# Patient Record
Sex: MALE | Race: WHITE | NOT HISPANIC OR LATINO | URBAN - METROPOLITAN AREA
[De-identification: names, ages, dates, MRNs, and addresses within clinical notes are randomized per-mention and may not be internally consistent; named-entity substitution may affect disease eponyms.]

---

## 2017-03-19 ENCOUNTER — EMERGENCY (EMERGENCY)
Facility: HOSPITAL | Age: 72
LOS: 1 days | Discharge: HOME | End: 2017-03-19
Admitting: EMERGENCY MEDICINE
Payer: MEDICARE

## 2017-03-19 VITALS
TEMPERATURE: 99 F | SYSTOLIC BLOOD PRESSURE: 151 MMHG | DIASTOLIC BLOOD PRESSURE: 86 MMHG | HEART RATE: 89 BPM | RESPIRATION RATE: 18 BRPM | OXYGEN SATURATION: 98 %

## 2017-03-19 PROCEDURE — 99283 EMERGENCY DEPT VISIT LOW MDM: CPT | Mod: 25

## 2017-03-19 RX ORDER — IBUPROFEN 200 MG
600 TABLET ORAL ONCE
Qty: 0 | Refills: 0 | Status: COMPLETED | OUTPATIENT
Start: 2017-03-19 | End: 2017-03-19

## 2017-03-19 RX ADMIN — Medication 600 MILLIGRAM(S): at 23:51

## 2017-03-19 NOTE — ED ADULT TRIAGE NOTE - CHIEF COMPLAINT QUOTE
pt. c/o R knee pain/swelling x 1 day, states it started while he was laying down yesterday, able to bear weight but states it is painful.  PMHx arthritis, takes 650mg for pain, last dose 830pm tonight.  Denies trauma/injury to the extremity.  Minor swelling noted, no redness/deformity.

## 2017-03-19 NOTE — ED PROVIDER NOTE - CARE PLAN
Principal Discharge DX:	Knee pain  Instructions for follow-up, activity and diet:	Rest, ice, elevate area.  Take Motrin 600mg every 8 hrs with food for pain.  Follow up with PMD within 48-72 hrs.  Any worsening pain, swelling, weakness, numbness return to ED. Ortho referral list provided if needed.

## 2017-03-19 NOTE — ED PROVIDER NOTE - PLAN OF CARE
Rest, ice, elevate area.  Take Motrin 600mg every 8 hrs with food for pain.  Follow up with PMD within 48-72 hrs.  Any worsening pain, swelling, weakness, numbness return to ED. Ortho referral list provided if needed.

## 2017-03-19 NOTE — ED PROVIDER NOTE - PROGRESS NOTE DETAILS
xray reviewed with Dr Lucero, +arthritic changes, no acute fx xray reviewed with Dr Lucero, +arthritic changes, no acute fx; bulky soliz applied, cane given to assist in ambulation xray reviewed with Dr Lucero, +arthritic changes, no acute fx; bulky soliz applied, cane given to assist in ambulation, pt requesting d/c - does not want to wait for prelim xray read from radiology resident

## 2017-03-19 NOTE — ED PROVIDER NOTE - OBJECTIVE STATEMENT
72 y/o M PMH BPH c/o atraumatic right knee pain since last night. Pain Pt states he was sitting in chair when pain started and now having difficulty ambulating and sleeping tonight because of pain. Pt took tylenol at approx 2030 last night with mild relief. Denies fever, chills, paresthesias, weakness, trauma. 72 y/o M PMH BPH, arthritis c/o atraumatic right knee pain since last night. Pain Pt states he was sitting in chair when pain started and now having difficulty ambulating and sleeping tonight because of pain. Pt took tylenol at approx 2030 last night with mild relief. Denies fever, chills, paresthesias, weakness, trauma, CP, SOB.

## 2017-03-19 NOTE — ED PROVIDER NOTE - MEDICAL DECISION MAKING DETAILS
70 y/o M with atraumatic right knee pain, low clinical suspicion for septic joint, likely exacerbation of arthritis  -xray, pain control, reassess

## 2017-03-19 NOTE — ED PROVIDER NOTE - LOWER EXTREMITY EXAM, RIGHT
LIMITED ROM/SWELLING/TENDERNESS/no obvious deformity, mild + suprapatellar swelling, mild TTP over patella and patella tendon, full extension, limited flexion past 90 degrees, sensation and strength intact

## 2017-03-20 PROCEDURE — 73564 X-RAY EXAM KNEE 4 OR MORE: CPT | Mod: 26,RT

## 2021-02-14 ENCOUNTER — EMERGENCY (EMERGENCY)
Facility: HOSPITAL | Age: 76
LOS: 1 days | Discharge: HOME | End: 2021-02-14
Attending: EMERGENCY MEDICINE
Payer: MEDICARE

## 2021-02-14 VITALS
DIASTOLIC BLOOD PRESSURE: 79 MMHG | OXYGEN SATURATION: 97 % | SYSTOLIC BLOOD PRESSURE: 165 MMHG | RESPIRATION RATE: 22 BRPM | TEMPERATURE: 97 F | HEIGHT: 70 IN | WEIGHT: 186.07 LBS | HEART RATE: 84 BPM

## 2021-02-14 VITALS
OXYGEN SATURATION: 99 % | TEMPERATURE: 98 F | HEART RATE: 65 BPM | SYSTOLIC BLOOD PRESSURE: 163 MMHG | DIASTOLIC BLOOD PRESSURE: 77 MMHG | RESPIRATION RATE: 20 BRPM

## 2021-02-14 PROCEDURE — 70450 CT HEAD/BRAIN W/O DYE: CPT

## 2021-02-14 PROCEDURE — 93005 ELECTROCARDIOGRAM TRACING: CPT

## 2021-02-14 PROCEDURE — 99284 EMERGENCY DEPT VISIT MOD MDM: CPT

## 2021-02-14 PROCEDURE — 99284 EMERGENCY DEPT VISIT MOD MDM: CPT | Mod: 25

## 2021-02-14 PROCEDURE — 70450 CT HEAD/BRAIN W/O DYE: CPT | Mod: 26

## 2021-02-14 NOTE — ED PROVIDER NOTE - CLINICAL SUMMARY MEDICAL DECISION MAKING FREE TEXT BOX
Pt is a 75 year old man with PMHx of arthritis, HTN and BPH who presents to the ED after a fall two days ago. NO LOC NO pain at present. Slight pain over bridge of nose. Periorbital ecchymosis not on ACs. Alert and oriented. Will r/o intracranial/subdural bleed and evaluate for any facial fractures on CT head.

## 2021-02-14 NOTE — ED PROVIDER NOTE - PATIENT PORTAL LINK FT
You can access the FollowMyHealth Patient Portal offered by [facility] by registering at the following website: [email or web address]. By joining [employer]’s FollowMyHealth portal, you will also be able to view your health information using other applications (apps) compatible with our system.

## 2021-02-14 NOTE — ED PROVIDER NOTE - NSFOLLOWUPINSTRUCTIONS_ED_ALL_ED_FT
IMPORTANT INSTRUCTIONS FROM Dr. CONTEH:    Please follow up with your personal medical doctor in 24-48 hours.   Bring results from today to your visit.    If you were advised to take any medications - be sure to review the package insert.    If your symptoms change, get worse or if you have any new symptoms, come to the ER right away.  If you have any questions, call the ER at the phone number on this page.

## 2021-02-14 NOTE — ED PROVIDER NOTE - ENMT, MLM
Airway patent, Nasal mucosa clear. Mouth with normal mucosa. Throat has no vesicles, no oropharyngeal exudates and uvula is midline. Pain to palpation over bridge of nose.

## 2021-02-14 NOTE — ED ADULT NURSE NOTE - OBJECTIVE STATEMENT
presents with c/o fall 2 days ago, bruise to B/L eye noted, denies dizziness, headache,   vision changes.

## 2021-02-14 NOTE — ED PROVIDER NOTE - CARE PLAN
Principal Discharge DX:	Facial pain   Principal Discharge DX:	Forehead contusion, initial encounter

## 2021-02-14 NOTE — ED PROVIDER NOTE - OBJECTIVE STATEMENT
Pt is a 75 year old man with PMHx of arthritis, HTN and BPH who presents to the ED after a fall two days ago. Pt was going down to his basement to evaluate his boiler when he slipped forward and fell hitting his face. Pt did not loose consciousness. He got up immediately and did not have any overt signs of injury. Pt states he had a bit of a bloody nose, which resolved quickly. The next day his wife noticed some bruising around his left eye, then today she noticed more bruising on his right and told him to come to ED for evaluation. Pt denies taking any ACs including ASA. Denies neck or back tenderness or pain. NO SOB or chest pain. No N/V or diarrhea. No vision changes reported.

## 2021-02-14 NOTE — ED PROVIDER NOTE - ATTENDING CONTRIBUTION TO CARE
76 yo m with pmh of HTN presents s/p mechanical slip and fall yesterday. Noticed facial bruising today so wife told pt to come to ED. Denies acute complaints.     VS wnl  No scalp hematomas  Bilateral bruising to medial periorbital area without nasal deformity  Neck without ttp  RRR  Lungs clear, chest wall stable  Abdo soft  AAOx3, CN's II-XII intact, strength 5/5 bilateral UE and LE, sensation intact to light touch, finger to nose intact, steady gait    AP - fall with facial trauma  Will obtain CT head given age, however low suspicion for ICH    315PM - CT pending, will sign out to Dr De La O to f/u

## 2021-02-14 NOTE — ED PROVIDER NOTE - EYES, MLM
Clear bilaterally, pupils equal, round and reactive to light. periorbital ecchymosis not tenderness to palpation.

## 2021-02-16 PROBLEM — N40.0 BENIGN PROSTATIC HYPERPLASIA WITHOUT LOWER URINARY TRACT SYMPTOMS: Chronic | Status: ACTIVE | Noted: 2017-03-20

## 2021-07-21 ENCOUNTER — EMERGENCY (EMERGENCY)
Facility: HOSPITAL | Age: 76
LOS: 1 days | Discharge: HOME | End: 2021-07-21
Attending: EMERGENCY MEDICINE
Payer: MEDICARE

## 2021-07-21 VITALS
HEIGHT: 70 IN | SYSTOLIC BLOOD PRESSURE: 146 MMHG | HEART RATE: 92 BPM | OXYGEN SATURATION: 97 % | WEIGHT: 176.37 LBS | RESPIRATION RATE: 19 BRPM | DIASTOLIC BLOOD PRESSURE: 73 MMHG | TEMPERATURE: 98 F

## 2021-07-21 PROBLEM — N40.0 BENIGN PROSTATIC HYPERPLASIA WITHOUT LOWER URINARY TRACT SYMPTOMS: Chronic | Status: ACTIVE | Noted: 2021-02-14

## 2021-07-21 PROBLEM — I10 ESSENTIAL (PRIMARY) HYPERTENSION: Chronic | Status: ACTIVE | Noted: 2021-02-14

## 2021-07-21 PROCEDURE — 99283 EMERGENCY DEPT VISIT LOW MDM: CPT

## 2021-07-21 PROCEDURE — 99284 EMERGENCY DEPT VISIT MOD MDM: CPT | Mod: 25

## 2021-07-21 PROCEDURE — 73610 X-RAY EXAM OF ANKLE: CPT | Mod: 26,RT

## 2021-07-21 PROCEDURE — 73562 X-RAY EXAM OF KNEE 3: CPT

## 2021-07-21 PROCEDURE — 73610 X-RAY EXAM OF ANKLE: CPT

## 2021-07-21 PROCEDURE — 73562 X-RAY EXAM OF KNEE 3: CPT | Mod: 26,RT

## 2021-07-21 RX ORDER — IBUPROFEN 200 MG
1 TABLET ORAL
Qty: 30 | Refills: 0
Start: 2021-07-21

## 2021-07-21 RX ORDER — IBUPROFEN 200 MG
600 TABLET ORAL ONCE
Refills: 0 | Status: COMPLETED | OUTPATIENT
Start: 2021-07-21 | End: 2021-07-21

## 2021-07-21 RX ORDER — OXYCODONE HYDROCHLORIDE 5 MG/1
1 TABLET ORAL
Qty: 6 | Refills: 0
Start: 2021-07-21

## 2021-07-21 RX ADMIN — Medication 600 MILLIGRAM(S): at 13:12

## 2021-07-21 RX ADMIN — Medication 600 MILLIGRAM(S): at 13:41

## 2021-07-21 NOTE — ED PROVIDER NOTE - CLINICAL SUMMARY MEDICAL DECISION MAKING FREE TEXT BOX
Patient with right ankle pain, right knee pain and swelling x3days Will do XR, give ibuprofen for pain and reassess.

## 2021-07-21 NOTE — ED ADULT TRIAGE NOTE - CHIEF COMPLAINT QUOTE
right ankle swelling and pain X 2 days radiating to right leg last took tylenol this morning with no relief.

## 2021-07-21 NOTE — ED PROVIDER NOTE - OBJECTIVE STATEMENT
74 y/o M patient with a significant PMHx of HTN, chronic knee problems presents to the ED with c/o 3d of right ankle pain and right knee pain and swelling today. Patient reports that he is not sure it happened. Patient states taking Tylenol with no relief.

## 2021-07-21 NOTE — ED PROVIDER NOTE - TEMPLATE, MLM
Refill:    Ondansetron ODT  4MG  Qty 60  Dissolve one tablet by mouth every   8hrs as needed for nausea    [facility]  [phone]   Orthopedic

## 2021-07-21 NOTE — ED PROVIDER NOTE - PATIENT PORTAL LINK FT
You can access the FollowMyHealth Patient Portal offered by [facility] by registering at the following website: [email or web address]. By joining [employer]’s FollowMyHealth portal, you will also be able to view your health information using other applications (apps) compatible with our system.

## 2021-11-01 NOTE — ED PROVIDER NOTE - HISTORY ATTESTATION, MLM
Called patient to see if he had gotten his medication yet and he said he hadn't spoken with [facility] yet. Dr. Farrell had advised to check and make sure this patient gets his Cosentyx. Sent a message to [facility] to let them know patient is back in town.       Received: Today  Zonia Mckeon RN  [facility],     I just called this patient who was out of town for a few days and he is back in town now. He said he did not talk to [facility] yet. I let him know that he would need to talk to you before [facility] could send his Cosentyx to the [employer] site at [facility]. Please call him when convenient for you. He denied training from Dr. Farrell's office for Cosentyx.     Thank you, and have a good Monday,   DON Brar            I have reviewed and confirmed nurses' notes...

## 2023-02-22 ENCOUNTER — EMERGENCY (EMERGENCY)
Facility: HOSPITAL | Age: 78
LOS: 1 days | Discharge: HOME | End: 2023-02-22
Attending: EMERGENCY MEDICINE
Payer: MEDICARE

## 2023-02-22 VITALS
OXYGEN SATURATION: 98 % | HEIGHT: 69 IN | TEMPERATURE: 98 F | DIASTOLIC BLOOD PRESSURE: 87 MMHG | HEART RATE: 89 BPM | SYSTOLIC BLOOD PRESSURE: 155 MMHG | WEIGHT: 190.92 LBS | RESPIRATION RATE: 18 BRPM

## 2023-02-22 LAB
ALBUMIN SERPL ELPH-MCNC: 3.8 G/DL — SIGNIFICANT CHANGE UP (ref 3.5–5)
ALP SERPL-CCNC: 70 U/L — SIGNIFICANT CHANGE UP (ref 40–120)
ALT FLD-CCNC: 37 U/L DA — SIGNIFICANT CHANGE UP (ref 10–60)
ANION GAP SERPL CALC-SCNC: 8 MMOL/L — SIGNIFICANT CHANGE UP (ref 5–17)
AST SERPL-CCNC: 23 U/L — SIGNIFICANT CHANGE UP (ref 10–40)
BASOPHILS # BLD AUTO: 0.09 K/UL — SIGNIFICANT CHANGE UP (ref 0–0.2)
BASOPHILS NFR BLD AUTO: 1.2 % — SIGNIFICANT CHANGE UP (ref 0–2)
BILIRUB SERPL-MCNC: 0.3 MG/DL — SIGNIFICANT CHANGE UP (ref 0.2–1.2)
BUN SERPL-MCNC: 11 MG/DL — SIGNIFICANT CHANGE UP (ref 7–18)
CALCIUM SERPL-MCNC: 9.6 MG/DL — SIGNIFICANT CHANGE UP (ref 8.4–10.5)
CHLORIDE SERPL-SCNC: 104 MMOL/L — SIGNIFICANT CHANGE UP (ref 96–108)
CO2 SERPL-SCNC: 24 MMOL/L — SIGNIFICANT CHANGE UP (ref 22–31)
CREAT SERPL-MCNC: 0.84 MG/DL — SIGNIFICANT CHANGE UP (ref 0.5–1.3)
EGFR: 89 ML/MIN/1.73M2 — SIGNIFICANT CHANGE UP
EOSINOPHIL # BLD AUTO: 0.26 K/UL — SIGNIFICANT CHANGE UP (ref 0–0.5)
EOSINOPHIL NFR BLD AUTO: 3.5 % — SIGNIFICANT CHANGE UP (ref 0–6)
GLUCOSE SERPL-MCNC: 113 MG/DL — HIGH (ref 70–99)
HCT VFR BLD CALC: 40.6 % — SIGNIFICANT CHANGE UP (ref 39–50)
HGB BLD-MCNC: 13.5 G/DL — SIGNIFICANT CHANGE UP (ref 13–17)
IMM GRANULOCYTES NFR BLD AUTO: 0.4 % — SIGNIFICANT CHANGE UP (ref 0–0.9)
LYMPHOCYTES # BLD AUTO: 1.56 K/UL — SIGNIFICANT CHANGE UP (ref 1–3.3)
LYMPHOCYTES # BLD AUTO: 21.3 % — SIGNIFICANT CHANGE UP (ref 13–44)
MCHC RBC-ENTMCNC: 28.6 PG — SIGNIFICANT CHANGE UP (ref 27–34)
MCHC RBC-ENTMCNC: 33.3 GM/DL — SIGNIFICANT CHANGE UP (ref 32–36)
MCV RBC AUTO: 86 FL — SIGNIFICANT CHANGE UP (ref 80–100)
MONOCYTES # BLD AUTO: 0.64 K/UL — SIGNIFICANT CHANGE UP (ref 0–0.9)
MONOCYTES NFR BLD AUTO: 8.7 % — SIGNIFICANT CHANGE UP (ref 2–14)
NEUTROPHILS # BLD AUTO: 4.76 K/UL — SIGNIFICANT CHANGE UP (ref 1.8–7.4)
NEUTROPHILS NFR BLD AUTO: 64.9 % — SIGNIFICANT CHANGE UP (ref 43–77)
NRBC # BLD: 0 /100 WBCS — SIGNIFICANT CHANGE UP (ref 0–0)
PLATELET # BLD AUTO: 256 K/UL — SIGNIFICANT CHANGE UP (ref 150–400)
POTASSIUM SERPL-MCNC: 3.8 MMOL/L — SIGNIFICANT CHANGE UP (ref 3.5–5.3)
POTASSIUM SERPL-SCNC: 3.8 MMOL/L — SIGNIFICANT CHANGE UP (ref 3.5–5.3)
PROT SERPL-MCNC: 7.6 G/DL — SIGNIFICANT CHANGE UP (ref 6–8.3)
RBC # BLD: 4.72 M/UL — SIGNIFICANT CHANGE UP (ref 4.2–5.8)
RBC # FLD: 13.1 % — SIGNIFICANT CHANGE UP (ref 10.3–14.5)
SODIUM SERPL-SCNC: 136 MMOL/L — SIGNIFICANT CHANGE UP (ref 135–145)
TROPONIN I, HIGH SENSITIVITY RESULT: 3.9 NG/L — SIGNIFICANT CHANGE UP
WBC # BLD: 7.34 K/UL — SIGNIFICANT CHANGE UP (ref 3.8–10.5)
WBC # FLD AUTO: 7.34 K/UL — SIGNIFICANT CHANGE UP (ref 3.8–10.5)

## 2023-02-22 PROCEDURE — 99284 EMERGENCY DEPT VISIT MOD MDM: CPT

## 2023-02-22 PROCEDURE — 99285 EMERGENCY DEPT VISIT HI MDM: CPT

## 2023-02-22 PROCEDURE — 93005 ELECTROCARDIOGRAM TRACING: CPT

## 2023-02-22 PROCEDURE — 85025 COMPLETE CBC W/AUTO DIFF WBC: CPT

## 2023-02-22 PROCEDURE — 36415 COLL VENOUS BLD VENIPUNCTURE: CPT

## 2023-02-22 PROCEDURE — 84484 ASSAY OF TROPONIN QUANT: CPT

## 2023-02-22 PROCEDURE — 80053 COMPREHEN METABOLIC PANEL: CPT

## 2023-02-22 NOTE — ED PROVIDER NOTE - NEUROLOGICAL, MLM
Alert and oriented, no focal deficits, no motor or sensory deficits. cn ii-xii intact, no dysmetria, non-ataxic gait, neck supple

## 2023-02-22 NOTE — ED PROVIDER NOTE - CLINICAL SUMMARY MEDICAL DECISION MAKING FREE TEXT BOX
77 yr old male with hx of HTN, anxiety recently started on zoloft and BPh presents to ed c/o chest tightness, sob, headache and flushed sensation from feet up to head with high bp on and off. no fever, no syncope, no n/v, no abd pain.     anxiety causing sx r/o atypical acs. labs, ekg, cxr, reassurance and encourage to continue with pcp care.

## 2023-02-22 NOTE — ED PROVIDER NOTE - OBJECTIVE STATEMENT
77 yr old male with hx of HTN, anxiety recently started on zoloft and BPh presents to ed c/o chest tightness, sob, headache and flushed sensation from feet up to head with high bp on and off. no fever, no syncope, no n/v, no abd pain.

## 2023-02-22 NOTE — ED ADULT NURSE NOTE - OBJECTIVE STATEMENT
Pt is here for elevated b/p for 10 days. Pt stated when he had episode of elevated b/p he will have headache, dizziness and "hard to swallow". Pt also mentioned he doesn't have trouble swallowing when eating solid food. No medical hx besides arthritis in both knees. Recently got prescribed medication for depression. AAOx4. GCS15. Pt is ambulatory with cane. Pt denied any symptoms at this moment.

## 2023-02-22 NOTE — ED PROVIDER NOTE - NS ED MD DISPO DISCHARGE
57M w/ left foot submet 3 wound and LLE cellulitis   - Patient seen and evaluated   - afebrile, WBC 10.55  -  Left foot submet 3 wound to bone, fibrogranular wound bed, no drainage/ purulence noted, no malodor, no tracking, left foot forefoot erythema extending to mid calf w/ increased edema to LLE  - Left foot MRI ordered   - Tentatively booked for L foot partial 3rd ray resection Friday 1/21 @ 10 30 am   - medical clearance documented 1/20 and appreciated  - please document status/need of pacemaker interrogation by EP prior to OR, discussed w/ primary team  - repeat COVID swab prior to OR  - NPO after midnight  - will follow   - disussed with attending  57M w/ left foot submet 3 wound and LLE cellulitis   - Patient seen and evaluated   - afebrile, WBC 10.55  -  Left foot submet 3 wound to bone, fibrogranular wound bed, no drainage/ purulence noted, no malodor, no tracking, left foot forefoot erythema extending to mid calf w/ increased edema to LLE  - Left foot MRI ordered   - Tentatively booked for L foot partial 3rd ray resection Friday 1/21 @ 10 30 am   - medical clearance documented 1/20 and appreciated  - documentation of pacemaker interrogation from december documented and in pt chart discussed w/ primary team  - repeat COVID swab prior to OR  - NPO after midnight  - will follow   - disussed with attending  Home

## 2023-02-22 NOTE — ED PROVIDER NOTE - EKG #1 DATE/TIME
22-Feb-2023 14:45
Implemented All Universal Safety Interventions:  [place] to call system. Call bell, personal items and telephone within reach. Instruct patient to call for assistance. Room bathroom lighting operational. Non-slip footwear when patient is off stretcher. Physically safe environment: no spills, clutter or unnecessary equipment. Stretcher in lowest position, wheels locked, appropriate side rails in place.

## 2023-02-22 NOTE — ED PROVIDER NOTE - PATIENT PORTAL LINK FT
You can access the FollowMyHealth Patient Portal offered by [facility] by registering at the following website: [email or web address]. By joining [employer]’s FollowMyHealth portal, you will also be able to view your health information using other applications (apps) compatible with our system.

## 2024-11-25 NOTE — ED PROVIDER NOTE - NS ED SCRIBE STATEMENT
Total Cumulative Dose (Cgy): 5418.0 Show Ultrasound In Note?: Yes Change Daily Dosage Administered Mid Treatment?: No Calculate Total Cumulative Dose Automatically Or Manually: Manually Add X Modifier?: HICKEY - Unusual Non-Overlapping Service Ultrasound Not Used Text: Ultrasound was not performed today due to Ultrasound Used Text: High frequency ultrasound depth is 0.91mm, which is 0.13mm in difference from previous imaging. Attending Prescription Used: 1 Treatment Documentation: Plan: This patient has been treated today with image-guided superficial radiation therapy for non-melanoma skin cancer. Written informed consent has been previously obtained from this patient for this treatment. This consent is documented in the patient's chart. The patient gave verbal consent to continue treatment today. The patient was treated with a specific radiation dose and setup as prescribed by the provider listed on this visit note. A Radiation Therapist performed administration of radiation under the supervision of a provider. The treatment parameters and cumulative dose are indicated above. Prior to administering the radiation, the patient underwent a verification therapeutic radiology simulation-aided field setting defining relevant normal and abnormal target anatomy and acquiring images with separate and distinct diagnostic high-frequency ultrasound to delineate tissues and determine whether to proceed with delivery of therapeutic, in addition to retrieve data necessary to develop an optimal radiation treatment process for the patient. The field placement simulation documents any change seen in overall tumor volume documented in the patient’s record, allows the clinician to indicate any needed changes in the treatment plan and/or prescription, provides diagnostic evaluation as the basis for performing the therapeutic procedure, and clearly identifies the information needed to decide to proceed with the therapeutic procedure. This process includes verification of the treatment port(s) and proper treatment positioning. All treatment ports were photographed within electronic medical records. The patient's lead blocking along with gross tumor volume and margin was confirmed. Considering superficial radiotherapy is clinical in setup, this requires the physician and radiation therapist to clarify the location interest being treated against initial images, ultrasound, pathology, and patient anatomy. Care was taken to ensure torrez treated were geometrically accurate and properly positioned using therapeutic radiology simulation-aided field setting verification per fraction. This process is also utilized to determine if any prescription or setup changes are necessary. These steps are therefore medically necessary to ensure safe and effective administration of radiation. Ongoing therapeutic radiology simulation-aided field setting verification is ordered throughout the course of therapy.\\n \\nA high-frequency ultrasound image was acquired today for a two-dimensional evaluation of the tumor volume, depth, width, breadth, review of prior response to treatment, provide geometric accuracy of field placement, and determine whether to proceed with therapeutic delivery.\\n \\nThe field placement and ultrasound imaging, per fraction, is separate and distinct from the initial simulation and is an important task in providing safe administration of superficial radiation therapy. Physician evaluation of the ultrasound information will be ongoing throughout the course of treatment and is deemed medically necessary to ensure the efficacy of treatment, whether to proceed with therapeutic delivery, and determine any necessary changes. Today's images were evaluated for determination of continuation of treatment with the current plan or with necessary changes as appropriate. Additionally, the use of ultrasound visualization and targeted assessment allows the patient to be able to see their cancer(s) progress, encouraging the patient to complete and maintain compliance through the full course of prescribed radiotherapy. Per Dr. Kayla Interiano, continued ultrasound guidance and therapeutic radiology simulation-aided field setting verification per fraction is required for field placement, measurement of tumor depth, tissue evaluation, progress, acute effect monitoring, and determination for therapeutic treatment delivery is appropriate. Bill For Simulation (Per Medicare, Typical Course Of Radiation Therapy Will Require Between One To Three Simulations): Yes- (Simple- 1 Site: 39980) Energy (Kv): 70 Fraction Number: 20 Daily Dosage (Cgy): 270.9

## 2025-02-20 NOTE — ED ADULT NURSE NOTE - TEMPLATE LIST FOR HEAD TO TOE ASSESSMENT
Please review; protocol failed/No Protocol      Last Office Visit: 02/14/2025-establish care appointment.     Medications listed as patient reported- written by previous PCP PELON Burris  
General